# Patient Record
Sex: FEMALE | URBAN - METROPOLITAN AREA
[De-identification: names, ages, dates, MRNs, and addresses within clinical notes are randomized per-mention and may not be internally consistent; named-entity substitution may affect disease eponyms.]

---

## 2024-06-15 ENCOUNTER — OFFICE VISIT (OUTPATIENT)
Dept: URGENT CARE | Age: 24
End: 2024-06-15

## 2024-06-15 VITALS
HEART RATE: 87 BPM | SYSTOLIC BLOOD PRESSURE: 106 MMHG | TEMPERATURE: 98 F | WEIGHT: 135 LBS | RESPIRATION RATE: 16 BRPM | BODY MASS INDEX: 25.49 KG/M2 | DIASTOLIC BLOOD PRESSURE: 70 MMHG | HEIGHT: 61 IN | OXYGEN SATURATION: 98 %

## 2024-06-15 DIAGNOSIS — S61.412A LACERATION OF LEFT HAND WITHOUT FOREIGN BODY, INITIAL ENCOUNTER: Primary | ICD-10-CM

## 2024-06-15 ASSESSMENT — ENCOUNTER SYMPTOMS
ABDOMINAL PAIN: 0
EYE PAIN: 0
DIARRHEA: 0
SHORTNESS OF BREATH: 0
NAUSEA: 0
VOMITING: 0

## 2024-06-15 NOTE — PATIENT INSTRUCTIONS
- Pt to drink lots of fluids  - Pt ok to take tylenol and ibuprofen as needed  - Pt to call if any symptoms worsen or follow up with PCP  - Pt to go to ER if have shortness of breath or chest pain  - Pt given wound care instructions  - Pt to monitor for sings and symptoms of infection

## 2024-06-15 NOTE — PROGRESS NOTES
Normal breath sounds and air entry. No stridor, decreased air movement or transmitted upper airway sounds. No decreased breath sounds, wheezing, rhonchi or rales.   Chest:      Chest wall: No tenderness.   Musculoskeletal:      Cervical back: Normal range of motion.   Skin:     General: Skin is warm and dry.      Findings: Laceration present. No abrasion, abscess, acne, bruising, burn, ecchymosis, erythema, signs of injury, lesion, petechiae, rash or wound.             Comments: Pt has two separate lacerations to left hand. First laceration is a superficial .5 cm linear laceration to palm proximal to the 3rd finger and the second one is a 1 cm superficial linear laceration between the webbing of the 3rd and 4th fingers of left hand. No oozing blood. No surrounding erythema, swelling, bruising, abrasions, foreign body, crepitus, drainage, numbness or tingling to touch. No decreased ROM of left hand or left 3rd and 4th fingers.    Neurological:      General: No focal deficit present.      Mental Status: She is alert and oriented to person, place, and time.   Psychiatric:         Mood and Affect: Mood normal.         Behavior: Behavior normal.         Thought Content: Thought content normal.         Judgment: Judgment normal.       PROCEDURES:  Unless otherwise noted below, none     LACERATION REPAIR    Date/Time: 6/15/2024 4:51 PM    Performed by: Carol Flannery PA-C  Authorized by: Carol Flannery PA-C  Body area: upper extremity  Location details: left hand  Wound length (cm): First laceration .5 cm and 2nd laceration 1 cm.  Foreign bodies: no foreign bodies  Tendon involvement: none  Nerve involvement: none  Vascular damage: no    Sedation:  Patient sedated: no    Preparation: Patient was prepped and draped in the usual sterile fashion.  Irrigation solution: saline  Irrigation method: syringe  Amount of cleaning: standard  Debridement: none  Degree of undermining: none  Skin closure: glue  Dressing: gauze